# Patient Record
Sex: FEMALE | Race: WHITE | Employment: OTHER | URBAN - METROPOLITAN AREA
[De-identification: names, ages, dates, MRNs, and addresses within clinical notes are randomized per-mention and may not be internally consistent; named-entity substitution may affect disease eponyms.]

---

## 2023-10-04 ENCOUNTER — OFFICE VISIT (OUTPATIENT)
Dept: PRIMARY CARE | Facility: CLINIC | Age: 73
End: 2023-10-04
Payer: MEDICARE

## 2023-10-04 VITALS
SYSTOLIC BLOOD PRESSURE: 128 MMHG | DIASTOLIC BLOOD PRESSURE: 80 MMHG | TEMPERATURE: 97.3 F | HEART RATE: 80 BPM | WEIGHT: 146 LBS | BODY MASS INDEX: 24.32 KG/M2 | HEIGHT: 65 IN

## 2023-10-04 DIAGNOSIS — E78.2 MIXED HYPERLIPIDEMIA: ICD-10-CM

## 2023-10-04 DIAGNOSIS — Z11.59 NEED FOR HEPATITIS C SCREENING TEST: ICD-10-CM

## 2023-10-04 DIAGNOSIS — I10 PRIMARY HYPERTENSION: ICD-10-CM

## 2023-10-04 DIAGNOSIS — Z00.00 MEDICARE ANNUAL WELLNESS VISIT, SUBSEQUENT: Primary | ICD-10-CM

## 2023-10-04 DIAGNOSIS — Z78.0 POSTMENOPAUSAL STATE: ICD-10-CM

## 2023-10-04 DIAGNOSIS — K21.9 GASTROESOPHAGEAL REFLUX DISEASE WITHOUT ESOPHAGITIS: ICD-10-CM

## 2023-10-04 DIAGNOSIS — Z12.31 ENCOUNTER FOR SCREENING MAMMOGRAM FOR BREAST CANCER: ICD-10-CM

## 2023-10-04 PROBLEM — J01.90 ACUTE RHINOSINUSITIS: Status: RESOLVED | Noted: 2023-10-04 | Resolved: 2023-10-04

## 2023-10-04 PROBLEM — N39.0 ACUTE LOWER UTI (URINARY TRACT INFECTION): Status: RESOLVED | Noted: 2023-10-04 | Resolved: 2023-10-04

## 2023-10-04 PROBLEM — K63.5 POLYP OF COLON: Status: ACTIVE | Noted: 2023-10-04

## 2023-10-04 PROBLEM — J30.2 SEASONAL ALLERGIES: Status: ACTIVE | Noted: 2023-10-04

## 2023-10-04 PROBLEM — R74.8 ELEVATED ALKALINE PHOSPHATASE LEVEL: Status: ACTIVE | Noted: 2023-10-04

## 2023-10-04 PROBLEM — N89.8 VAGINAL DRYNESS: Status: ACTIVE | Noted: 2023-10-04

## 2023-10-04 PROBLEM — D22.5 MELANOCYTIC NEVI OF TRUNK: Status: RESOLVED | Noted: 2022-10-04 | Resolved: 2023-10-04

## 2023-10-04 PROBLEM — D18.01 HEMANGIOMA OF SKIN AND SUBCUTANEOUS TISSUE: Status: RESOLVED | Noted: 2022-10-04 | Resolved: 2023-10-04

## 2023-10-04 PROBLEM — E78.5 HLD (HYPERLIPIDEMIA): Status: ACTIVE | Noted: 2023-10-04

## 2023-10-04 PROBLEM — R39.9 URINARY SYMPTOM OR SIGN: Status: RESOLVED | Noted: 2023-10-04 | Resolved: 2023-10-04

## 2023-10-04 PROBLEM — R39.9 UTI SYMPTOMS: Status: RESOLVED | Noted: 2023-10-04 | Resolved: 2023-10-04

## 2023-10-04 PROBLEM — K64.4 EXTERNAL HEMORRHOIDS: Status: ACTIVE | Noted: 2020-03-18

## 2023-10-04 PROBLEM — L25.9 CONTACT DERMATITIS: Status: RESOLVED | Noted: 2023-10-04 | Resolved: 2023-10-04

## 2023-10-04 PROBLEM — F41.9 ANXIETY: Status: ACTIVE | Noted: 2023-10-04

## 2023-10-04 PROBLEM — L82.1 OTHER SEBORRHEIC KERATOSIS: Status: RESOLVED | Noted: 2022-10-04 | Resolved: 2023-10-04

## 2023-10-04 PROBLEM — L81.4 OTHER MELANIN HYPERPIGMENTATION: Status: RESOLVED | Noted: 2022-10-04 | Resolved: 2023-10-04

## 2023-10-04 LAB
NON-UH HIE A/G RATIO: 1.6
NON-UH HIE ALB: 4.3 G/DL (ref 3.4–5)
NON-UH HIE ALK PHOS: 96 UNIT/L (ref 46–116)
NON-UH HIE BILIRUBIN, TOTAL: 0.7 MG/DL (ref 0.2–1)
NON-UH HIE BUN/CREAT RATIO: 16.7
NON-UH HIE BUN: 15 MG/DL (ref 9–23)
NON-UH HIE CALCIUM: 9.7 MG/DL (ref 8.7–10.4)
NON-UH HIE CALCULATED LDL CHOLESTEROL: 96 MG/DL (ref 60–130)
NON-UH HIE CALCULATED OSMOLALITY: 284 MOSM/KG (ref 275–295)
NON-UH HIE CHLORIDE: 107 MMOL/L (ref 98–107)
NON-UH HIE CHOLESTEROL: 184 MG/DL (ref 100–200)
NON-UH HIE CO2, VENOUS: 28 MMOL/L (ref 20–31)
NON-UH HIE CREATININE: 0.9 MG/DL (ref 0.5–0.8)
NON-UH HIE GFR AA: >60
NON-UH HIE GLOBULIN: 2.7 G/DL
NON-UH HIE GLOMERULAR FILTRATION RATE: >60 ML/MIN/1.73M?
NON-UH HIE GLUCOSE: 105 MG/DL (ref 74–106)
NON-UH HIE GOT: 20 UNIT/L (ref 15–37)
NON-UH HIE GPT: 17 UNIT/L (ref 10–49)
NON-UH HIE HDL CHOLESTEROL: 60 MG/DL (ref 40–60)
NON-UH HIE HEPATITIS C ANTIBODY: NONREACTIVE
NON-UH HIE K: 4.3 MMOL/L (ref 3.5–5.1)
NON-UH HIE NA: 142 MMOL/L (ref 135–145)
NON-UH HIE TOTAL CHOL/HDL CHOL RATIO: 3.1
NON-UH HIE TOTAL PROTEIN: 7 G/DL (ref 5.7–8.2)
NON-UH HIE TRIGLYCERIDES: 141 MG/DL (ref 30–150)

## 2023-10-04 PROCEDURE — 1159F MED LIST DOCD IN RCRD: CPT | Performed by: FAMILY MEDICINE

## 2023-10-04 PROCEDURE — 3079F DIAST BP 80-89 MM HG: CPT | Performed by: FAMILY MEDICINE

## 2023-10-04 PROCEDURE — G0008 ADMIN INFLUENZA VIRUS VAC: HCPCS | Performed by: FAMILY MEDICINE

## 2023-10-04 PROCEDURE — 3074F SYST BP LT 130 MM HG: CPT | Performed by: FAMILY MEDICINE

## 2023-10-04 PROCEDURE — G0439 PPPS, SUBSEQ VISIT: HCPCS | Performed by: FAMILY MEDICINE

## 2023-10-04 PROCEDURE — 90662 IIV NO PRSV INCREASED AG IM: CPT | Performed by: FAMILY MEDICINE

## 2023-10-04 PROCEDURE — 1036F TOBACCO NON-USER: CPT | Performed by: FAMILY MEDICINE

## 2023-10-04 PROCEDURE — 1160F RVW MEDS BY RX/DR IN RCRD: CPT | Performed by: FAMILY MEDICINE

## 2023-10-04 PROCEDURE — 1170F FXNL STATUS ASSESSED: CPT | Performed by: FAMILY MEDICINE

## 2023-10-04 PROCEDURE — 99213 OFFICE O/P EST LOW 20 MIN: CPT | Performed by: FAMILY MEDICINE

## 2023-10-04 RX ORDER — AMLODIPINE BESYLATE 2.5 MG/1
2.5 TABLET ORAL DAILY
Qty: 90 TABLET | Refills: 3 | Status: SHIPPED | OUTPATIENT
Start: 2023-10-04

## 2023-10-04 RX ORDER — OMEPRAZOLE 40 MG/1
1 CAPSULE, DELAYED RELEASE ORAL DAILY
COMMUNITY
Start: 2022-10-08 | End: 2023-10-04 | Stop reason: SDUPTHER

## 2023-10-04 RX ORDER — AMLODIPINE BESYLATE 2.5 MG/1
1 TABLET ORAL DAILY
COMMUNITY
Start: 2019-11-21 | End: 2023-10-04 | Stop reason: SDUPTHER

## 2023-10-04 RX ORDER — SIMVASTATIN 20 MG/1
20 TABLET, FILM COATED ORAL EVERY EVENING
Qty: 90 TABLET | Refills: 3 | Status: SHIPPED | OUTPATIENT
Start: 2023-10-04

## 2023-10-04 RX ORDER — SIMVASTATIN 20 MG/1
1 TABLET, FILM COATED ORAL EVERY EVENING
COMMUNITY
Start: 2014-10-29 | End: 2023-10-04 | Stop reason: SDUPTHER

## 2023-10-04 RX ORDER — OMEPRAZOLE 40 MG/1
40 CAPSULE, DELAYED RELEASE ORAL DAILY
Qty: 90 CAPSULE | Refills: 3 | Status: SHIPPED | OUTPATIENT
Start: 2023-10-04

## 2023-10-04 ASSESSMENT — ACTIVITIES OF DAILY LIVING (ADL)
MANAGING_FINANCES: INDEPENDENT
DRESSING: INDEPENDENT
DOING_HOUSEWORK: INDEPENDENT
TAKING_MEDICATION: INDEPENDENT
GROCERY_SHOPPING: INDEPENDENT
BATHING: INDEPENDENT

## 2023-10-04 ASSESSMENT — ENCOUNTER SYMPTOMS
HEADACHES: 0
PALPITATIONS: 0
ORTHOPNEA: 0
MYALGIAS: 0
LEG PAIN: 0
SWEATS: 0
BLURRED VISION: 0
NECK PAIN: 0
HYPERTENSION: 1
FOCAL SENSORY LOSS: 0
PND: 0
FOCAL WEAKNESS: 0
SHORTNESS OF BREATH: 0

## 2023-10-04 ASSESSMENT — PATIENT HEALTH QUESTIONNAIRE - PHQ9
SUM OF ALL RESPONSES TO PHQ9 QUESTIONS 1 AND 2: 0
1. LITTLE INTEREST OR PLEASURE IN DOING THINGS: NOT AT ALL
2. FEELING DOWN, DEPRESSED OR HOPELESS: NOT AT ALL

## 2023-10-04 NOTE — PROGRESS NOTES
Subjective   Reason for Visit: Josse Owens is an 73 y.o. female here for a Medicare Wellness visit.     Past Medical, Surgical, and Family History reviewed and updated in chart.    Reviewed all medications by prescribing practitioner or clinical pharmacist (such as prescriptions, OTCs, herbal therapies and supplements) and documented in the medical record.    Very pleasant 73 year old here for annual wellness exam  No recent changes or hospital visits  Followup hypertension and hyperlipidemia   Stable no issues     Hyperlipidemia  This is a chronic problem. The current episode started more than 1 year ago. The problem is controlled. Recent lipid tests were reviewed and are variable. She has no history of chronic renal disease, diabetes, hypothyroidism, liver disease, obesity or nephrotic syndrome. There are no known factors aggravating her hyperlipidemia. Pertinent negatives include no chest pain, focal sensory loss, focal weakness, leg pain, myalgias or shortness of breath. Current antihyperlipidemic treatment includes exercise, diet change and statins. The current treatment provides significant improvement of lipids. There are no compliance problems.  Risk factors for coronary artery disease include dyslipidemia, family history and post-menopausal.   Hypertension  This is a chronic problem. The current episode started more than 1 year ago. The problem is controlled. Pertinent negatives include no anxiety, blurred vision, chest pain, headaches, malaise/fatigue, neck pain, orthopnea, palpitations, peripheral edema, PND, shortness of breath or sweats. There are no associated agents to hypertension. Risk factors for coronary artery disease include family history and post-menopausal state. Past treatments include calcium channel blockers. The current treatment provides significant improvement. There are no compliance problems.  There is no history of angina, kidney disease, CAD/MI, CVA, heart failure, left  ventricular hypertrophy, PVD or retinopathy. There is no history of chronic renal disease, coarctation of the aorta, hyperaldosteronism, hypercortisolism, hyperparathyroidism, a hypertension causing med, pheochromocytoma, renovascular disease, sleep apnea or a thyroid problem.       Patient Self Assessment of Health Status  Patient Self Assessment: Good    Nutrition and Exercise  Current Diet: Well Balanced Diet  Adequate Fluid Intake: Yes  Caffeine: Yes  Exercise Frequency: Regularly    Functional Ability/Level of Safety  Cognitive Impairment Observed: No cognitive impairment observed    Home Safety Risk Factors: None         Patient Care Team:  Jerri Krishnan MD as PCP - General  Jerri Krishnan MD as PCP - [facility] Attributed Provider     Review of Systems   Constitutional:  Negative for malaise/fatigue.   Eyes:  Negative for blurred vision.   Respiratory:  Negative for shortness of breath.    Cardiovascular:  Negative for chest pain, palpitations, orthopnea and PND.   Musculoskeletal:  Negative for myalgias and neck pain.   Neurological:  Negative for focal weakness and headaches.     Constitutional: no chills, no fever and no night sweats.   Eyes: no blurred vision and no eyesight problems.   ENT: no hearing loss, no nasal congestion, no nasal discharge, no hoarseness and no sore throat.   Cardiovascular: no chest pain, no intermittent leg claudication, no lower extremity edema, no palpitations and no syncope.   Respiratory: no cough, no shortness of breath during exertion, no shortness of breath at rest and no wheezing.   Gastrointestinal: no abdominal pain, no blood in stools, no constipation, no diarrhea, no melena, no nausea, no rectal pain and no vomiting.   Genitourinary: no dysuria, no change in urinary frequency, no urinary hesitancy, no feelings of urinary urgency and no vaginal discharge.   Musculoskeletal: no arthralgias,  no back pain and no myalgias.   Integumentary: no new skin lesions and no  "rashes.   Neurological: no difficulty walking, no headache, no limb weakness, no numbness and no tingling.   Psychiatric: no anxiety, no depression, no anhedonia and no substance use disorders.   Endocrine: no recent weight gain and no recent weight loss.   Hematologic/Lymphatic: no tendency for easy bruising and no swollen glands .    Medicare Wellness Billing Compliance Satisfied    *This is a visual tool to show completion of required items on the day of the visit. Green checks will only appear on the date of visit.      Objective   Vitals:  /80   Pulse 80   Temp 36.3 °C (97.3 °F)   Ht 1.651 m (5' 5\")   Wt 66.2 kg (146 lb)   BMI 24.30 kg/m²       Physical Exam  The patient appeared well nourished and normally developed. Vital signs as documented. Head exam is unremarkable. No scleral icterus or corneal arcus noted.  Pupils are equal round reactive to light extraocular movements are intact no hemorrhages noted on funduscopic exam mouth mucous membranes are moist no exudates ears canals clear TMs are gray pearly not injected nose no rhinorrhea or epistaxis Neck is without jugular venous distension, thyromegaly, or carotid bruits. Carotid upstrokes are brisk bilaterally. Lungs are clear to auscultation and percussion. Cardiac exam reveals the PMI to be normally sized and situated. Rhythm is regular. First and second heart sounds normal. No murmurs, rubs or gallops. Abdominal exam reveals normal bowel sounds, no masses, no organomegaly and no aortic enlargement. Extremities are nonedematous and both femoral and pedal pulses are normal.  Neurologic exam DTRs are equal bilaterally no focal deficits strength is symmetrical heme lymph no palpable lymph nodes in the neck axilla or groin    Assessment/Plan   Problem List Items Addressed This Visit       Gastroesophageal reflux disease    Relevant Medications    omeprazole (PriLOSEC) 40 mg DR capsule    Hypertension    Relevant Medications    amLODIPine (Norvasc) " 2.5 mg tablet    Other Relevant Orders    Comprehensive Metabolic Panel    HLD (hyperlipidemia)    Relevant Medications    simvastatin (Zocor) 20 mg tablet    Other Relevant Orders    Lipid Panel    Medicare annual wellness visit, subsequent - Primary    Current Assessment & Plan     Continue annual exams  Normal exam          Other Visit Diagnoses       Need for hepatitis C screening test        Relevant Orders    Hepatitis C antibody    Postmenopausal state        Relevant Orders    XR DEXA bone density    Encounter for screening mammogram for breast cancer        Relevant Orders    BI mammo bilateral screening tomosynthesis          Hypertension controlled continue amlodipine  GERD stable contineu diet modifications and continue omeprazole  Hyperlipidemia stable based on symptoms and exam   Continue zocor   Continue annual exams

## 2023-10-10 ENCOUNTER — OFFICE VISIT (OUTPATIENT)
Dept: DERMATOLOGY | Facility: CLINIC | Age: 73
End: 2023-10-10
Payer: MEDICARE

## 2023-10-10 DIAGNOSIS — L82.0 KERATOSIS, INFLAMED SEBORRHEIC: ICD-10-CM

## 2023-10-10 DIAGNOSIS — Z12.83 SKIN CANCER SCREENING: Primary | ICD-10-CM

## 2023-10-10 PROCEDURE — 1036F TOBACCO NON-USER: CPT | Performed by: NURSE PRACTITIONER

## 2023-10-10 PROCEDURE — 99213 OFFICE O/P EST LOW 20 MIN: CPT | Performed by: NURSE PRACTITIONER

## 2023-10-10 PROCEDURE — 1159F MED LIST DOCD IN RCRD: CPT | Performed by: NURSE PRACTITIONER

## 2023-10-10 PROCEDURE — 1160F RVW MEDS BY RX/DR IN RCRD: CPT | Performed by: NURSE PRACTITIONER

## 2023-10-10 NOTE — PROGRESS NOTES
Subjective     Josse Owens is a 73 y.o. female who presents for the following: Skin Check (Pt in for annual skin check. Last seen by Deedee Wang in 0ct 2022.).     Review of Systems:  No other skin or systemic complaints other than what is documented elsewhere in the note.    The following portions of the chart were reviewed this encounter and updated as appropriate:       Skin Cancer History  No skin cancer on file.    Specialty Problems    None    Past Medical History:  Josse Owens  has a past medical history of Acute lower UTI (urinary tract infection) (10/04/2023), Acute rhinosinusitis (10/04/2023), Allergy, unspecified, initial encounter (11/03/2013), Cough, unspecified (11/03/2013), Dehydration (11/03/2013), Dermatitis, unspecified (11/03/2013), Edema, unspecified (11/03/2013), Encounter for general adult medical examination without abnormal findings (11/03/2013), Encounter for gynecological examination (general) (routine) without abnormal findings (11/03/2013), Hemangioma of skin and subcutaneous tissue (10/04/2022), Melanocytic nevi of trunk (10/04/2022), Noninfective gastroenteritis and colitis, unspecified (11/03/2013), Other fatigue (11/03/2013), Other seborrheic keratosis (10/04/2022), Other specified disorders of veins (11/03/2013), Personal history of other diseases of the nervous system and sense organs (10/29/2014), Personal history of peptic ulcer disease, Personal history of urinary (tract) infections (06/24/2015), Unspecified abdominal pain (11/03/2013), and Urinary symptom or sign (10/04/2023).    Past Surgical History:  Josse Owens  has a past surgical history that includes Other surgical history (11/13/2019); Tonsillectomy (10/29/2014); Colonoscopy (11/01/2017); Other surgical history (11/03/2020); and Other surgical history (11/03/2020).    Family History:  Patient family history includes Arthritis in her mother; Heart disease in her father.    Social History:  Josse DEL ROSARIO  Graciela  reports that she has never smoked. She has never used smokeless tobacco. She reports that she does not drink alcohol and does not use drugs.    Allergies:  Bee venom protein (honey bee), Egg, Penicillins, and Tetracyclines    Current Medications / CAM's:    Current Outpatient Medications:     amLODIPine (Norvasc) 2.5 mg tablet, Take 1 tablet (2.5 mg) by mouth once daily., Disp: 90 tablet, Rfl: 3    omeprazole (PriLOSEC) 40 mg DR capsule, Take 1 capsule (40 mg) by mouth once daily., Disp: 90 capsule, Rfl: 3    simvastatin (Zocor) 20 mg tablet, Take 1 tablet (20 mg) by mouth once daily in the evening., Disp: 90 tablet, Rfl: 3     Objective   Well appearing patient in no apparent distress; mood and affect are within normal limits.    A full examination was performed including scalp, head, eyes, ears, nose, lips, neck, chest, axillae, abdomen, back, buttocks, bilateral upper extremities, bilateral lower extremities, hands, feet, fingers, toes, fingernails, and toenails. All findings within normal limits unless otherwise noted below.    Assessment/Plan

## 2023-10-10 NOTE — PROGRESS NOTES
Subjective     Josse Owens is a 73 y.o. female who presents for the following: Skin Check (Pt in for annual skin check. Last seen by Deedee Wang in 0ct 2022.).     Review of Systems:  No other skin or systemic complaints other than what is documented elsewhere in the note.    The following portions of the chart were reviewed this encounter and updated as appropriate:          Skin Cancer History  No skin cancer on file.      Specialty Problems    None       Objective   Well appearing patient in no apparent distress; mood and affect are within normal limits.    A focused skin examination was performed. All findings within normal limits unless otherwise noted below.    Assessment/Plan   1. Skin cancer screening  Benign skin lesions noted.     -These lesions have benign, reassuring patterns on dermoscopy.  -There were no concerning features found on exam today.  -Recommend continued self-observation, and to contact the office if any changes in nevi are  noticed.    Benign appearing nevi  Instructions: Monthly self-skin checks to monitor for any changes in moles are recommended. Expectations: Benign Nevi are pigmented nests of cells within the skin. No treatment is necessary.     Lentigines, self limited  -Benign, Reassurance  -Recommend continued observation.    [place]unsford Angiomas, self limited  -Benign, reassurance  -Recommend non-intervention and continued observation.    Seborrheic Keratoses, self limited  -Benign, reassurance  -These are healthy, benign growths of the skin. They are not dangerous and do not require any  treatment.  -Recommend non-intervention and continued observation.    Discussed/information given on safe sun practices and use of sunscreen, sun protective clothing or sun avoidance. Recommend to use OTC medication of sunscreen SPF 30 or higher on a daily basis prior to sun exposure to reduce the risk of skin cancer.    Contact Office if: Any lesions change in size, shape or color; itch, bum or  bleed.    2. Keratosis, inflamed seborrheic  Right Abdomen (side) - Lower  Irritated crusted macule     Plan:Counseling.  I counseled the patient regarding the following:  Skin Care: Seborrheic Keratoses are benign. No treatment is necessary, however given the amount of irritation on exam today treatment is initiated.  Expectations:Seborrheic Keratoses are benign warty growths. Patients get more of them as they age.

## 2023-10-10 NOTE — PROGRESS NOTES
Subjective     Josse Owens is a 73 y.o. female who presents for the following: Skin Check (Pt in for annual skin check. Last seen by Deedee Wang in 0ct 2022.).     Review of Systems:  No other skin or systemic complaints other than what is documented elsewhere in the note.    The following portions of the chart were reviewed this encounter and updated as appropriate:          Skin Cancer History  No skin cancer on file.      Specialty Problems    None       Objective   Well appearing patient in no apparent distress; mood and affect are within normal limits.    A focused skin examination was performed. All findings within normal limits unless otherwise noted below.    Assessment/Plan   1. Skin cancer screening  Benign skin lesions noted.     -These lesions have benign, reassuring patterns on dermoscopy.  -There were no concerning features found on exam today.  -Recommend continued self-observation, and to contact the office if any changes in nevi are  noticed.    Benign appearing nevi  Instructions: Monthly self-skin checks to monitor for any changes in moles are recommended. Expectations: Benign Nevi are pigmented nests of cells within the skin. No treatment is necessary.     Lentigines, self limited  -Benign, Reassurance  -Recommend continued observation.    Lunsford Angiomas, self limited  -Benign, reassurance  -Recommend non-intervention and continued observation.    Seborrheic Keratoses, self limited  -Benign, reassurance  -These are healthy, benign growths of the skin. They are not dangerous and do not require any  treatment.  -Recommend non-intervention and continued observation.    Discussed/information given on safe sun practices and use of sunscreen, sun protective clothing or sun avoidance. Recommend to use OTC medication of sunscreen SPF 30 or higher on a daily basis prior to sun exposure to reduce the risk of skin cancer.    Contact Office if: Any lesions change in size, shape or color; itch, bum or  bleed.    2. Keratosis, inflamed seborrheic  Right Abdomen (side) - Lower  Irritated crusted macule     Plan:Counseling.  I counseled the patient regarding the following:  Skin Care: Seborrheic Keratoses are benign. No treatment is necessary, however given the amount of irritation on exam today treatment is initiated.  Expectations:Seborrheic Keratoses are benign warty growths. Patients get more of them as they age.      Cryotherapy, skin lesion - Right Abdomen (side) - Lower

## 2023-10-27 ENCOUNTER — TELEPHONE (OUTPATIENT)
Dept: PRIMARY CARE | Facility: CLINIC | Age: 73
End: 2023-10-27
Payer: MEDICARE

## 2023-11-13 ENCOUNTER — TELEPHONE (OUTPATIENT)
Dept: PRIMARY CARE | Facility: CLINIC | Age: 73
End: 2023-11-13
Payer: MEDICARE

## 2023-11-13 NOTE — TELEPHONE ENCOUNTER
----- Message from Jerri Krishnan MD sent at 11/12/2023 10:54 PM EST -----  Please call patient  Her blood work is normal her cholesterol levels are excellent we can repeat in 1 year

## 2023-11-13 NOTE — TELEPHONE ENCOUNTER
----- Message from Jerri Krishnan MD sent at 11/12/2023 10:55 PM EST -----  Please call patient  Bone density shows she has osteoporosis  We can discuss treatment the next time she is in the office  I do recommend regular physical exercise lifting weights and calcium and vitamin D supplement

## 2023-11-13 NOTE — RESULT ENCOUNTER NOTE
Please call patient  Her blood work is normal her cholesterol levels are excellent we can repeat in 1 year

## 2023-11-13 NOTE — RESULT ENCOUNTER NOTE
Please call patient  Bone density shows she has osteoporosis  We can discuss treatment the next time she is in the office  I do recommend regular physical exercise lifting weights and calcium and vitamin D supplement

## 2023-11-24 NOTE — RESULT ENCOUNTER NOTE
Please call patient  Her mammogram is negative with no sign of cancer at this time  Repeat in 1 year  Call if you need anything

## 2023-11-27 ENCOUNTER — TELEPHONE (OUTPATIENT)
Dept: PRIMARY CARE | Facility: CLINIC | Age: 73
End: 2023-11-27
Payer: MEDICARE

## 2023-11-27 NOTE — TELEPHONE ENCOUNTER
----- Message from Jerri Krishnan MD sent at 11/24/2023  4:38 PM EST -----  Please call patient  Her mammogram is negative with no sign of cancer at this time  Repeat in 1 year  Call if you need anything

## 2023-12-05 ENCOUNTER — OFFICE VISIT (OUTPATIENT)
Dept: PRIMARY CARE | Facility: CLINIC | Age: 73
End: 2023-12-05
Payer: MEDICARE

## 2023-12-05 VITALS
BODY MASS INDEX: 24.32 KG/M2 | SYSTOLIC BLOOD PRESSURE: 130 MMHG | TEMPERATURE: 98 F | HEART RATE: 88 BPM | HEIGHT: 65 IN | WEIGHT: 146 LBS | DIASTOLIC BLOOD PRESSURE: 70 MMHG

## 2023-12-05 DIAGNOSIS — L25.9 CONTACT DERMATITIS, UNSPECIFIED CONTACT DERMATITIS TYPE, UNSPECIFIED TRIGGER: Primary | ICD-10-CM

## 2023-12-05 DIAGNOSIS — M81.0 AGE-RELATED OSTEOPOROSIS WITHOUT CURRENT PATHOLOGICAL FRACTURE: ICD-10-CM

## 2023-12-05 PROCEDURE — 1036F TOBACCO NON-USER: CPT | Performed by: FAMILY MEDICINE

## 2023-12-05 PROCEDURE — 1159F MED LIST DOCD IN RCRD: CPT | Performed by: FAMILY MEDICINE

## 2023-12-05 PROCEDURE — 3078F DIAST BP <80 MM HG: CPT | Performed by: FAMILY MEDICINE

## 2023-12-05 PROCEDURE — 3075F SYST BP GE 130 - 139MM HG: CPT | Performed by: FAMILY MEDICINE

## 2023-12-05 PROCEDURE — 1160F RVW MEDS BY RX/DR IN RCRD: CPT | Performed by: FAMILY MEDICINE

## 2023-12-05 PROCEDURE — 99213 OFFICE O/P EST LOW 20 MIN: CPT | Performed by: FAMILY MEDICINE

## 2023-12-05 RX ORDER — TRIAMCINOLONE ACETONIDE 1 MG/G
CREAM TOPICAL 2 TIMES DAILY
Qty: 30 G | Refills: 2 | Status: SHIPPED | OUTPATIENT
Start: 2023-12-05

## 2023-12-05 RX ORDER — METHYLPREDNISOLONE 4 MG/1
TABLET ORAL
Qty: 21 TABLET | Refills: 0 | Status: SHIPPED | OUTPATIENT
Start: 2023-12-05 | End: 2023-12-12

## 2023-12-05 RX ORDER — TRIAMCINOLONE ACETONIDE 1 MG/G
CREAM TOPICAL 2 TIMES DAILY
Qty: 30 G | Refills: 0 | Status: SHIPPED | OUTPATIENT
Start: 2023-12-05 | End: 2023-12-05 | Stop reason: SDUPTHER

## 2023-12-05 ASSESSMENT — PATIENT HEALTH QUESTIONNAIRE - PHQ9
2. FEELING DOWN, DEPRESSED OR HOPELESS: NOT AT ALL
SUM OF ALL RESPONSES TO PHQ9 QUESTIONS 1 AND 2: 0
1. LITTLE INTEREST OR PLEASURE IN DOING THINGS: NOT AT ALL

## 2023-12-05 NOTE — PROGRESS NOTES
"Subjective   Patient ID: Josse Owens is a 73 y.o. female who presents for Osteoporosis (Seen on recent DEXA).  Very pleasant 73-year-old   Recently diagnosed with osteoporosis on routine screening DEXA  Very hesitant to take medication  Here to discuss the result and possible options  Reviewed diet vitamin D and calcium supplement regular physical exercise  She likes that actually better tried at the medication  Concerned about stomach issues no also here for rash  Has had it for about a week very itchy  Lower extremities and arm  Concerned she might of contracted something  No tightness\" difficulty breathing no fever or chills no nausea or vomiting no mental status changes        Review of Systems  Constitutional: no chills, no fever and no night sweats.   Eyes: no blurred vision and no eyesight problems.   ENT: no hearing loss, no nasal congestion, no nasal discharge, no hoarseness and no sore throat.   Cardiovascular: no chest pain, no intermittent leg claudication, no lower extremity edema, no palpitations and no syncope.   Respiratory: no cough, no shortness of breath during exertion, no shortness of breath at rest and no wheezing.   Gastrointestinal: no abdominal pain, no blood in stools, no constipation, no diarrhea, no melena, no nausea, no rectal pain and no vomiting.   Genitourinary: no dysuria, no change in urinary frequency, no urinary hesitancy, no feelings of urinary urgency and no vaginal discharge.   Musculoskeletal: no arthralgias,  no back pain and no myalgias.   Integumentary: no new skin lesions and no rashes.   Neurological: no difficulty walking, no headache, no limb weakness, no numbness and no tingling.   Psychiatric: no anxiety, no depression, no anhedonia and no substance use disorders.   Endocrine: no recent weight gain and no recent weight loss.   Hematologic/Lymphatic: no tendency for easy bruising and no swollen glands .    Objective    /70   Pulse 88   " "Temp 36.7 °C (98 °F)   Ht 1.651 m (5' 5\")   Wt 66.2 kg (146 lb)   BMI 24.30 kg/m²    Physical Exam  The patient appeared well nourished and normally developed. Vital signs as documented. Head exam is unremarkable. No scleral icterus or corneal arcus noted.  Pupils are equal round reactive to light extraocular movements are intact no hemorrhages noted on funduscopic exam mouth mucous membranes are moist no exudates ears canals clear TMs are gray pearly not injected nose no rhinorrhea or epistaxis Neck is without jugular venous distension, thyromegaly, or carotid bruits. Carotid upstrokes are brisk bilaterally. Lungs are clear to auscultation and percussion. Cardiac exam reveals the PMI to be normally sized and situated. Rhythm is regular. First and second heart sounds normal. No murmurs, rubs or gallops. Abdominal exam reveals normal bowel sounds, no masses, no organomegaly and no aortic enlargement. Extremities are nonedematous and both femoral and pedal pulses are normal.  Neurologic exam DTRs are equal bilaterally no focal deficits strength is symmetrical heme lymph no palpable lymph nodes in the neck axilla or groin    Assessment/Plan   Problem List Items Addressed This Visit       Contact dermatitis - Primary     Medrol Dosepak  Apply moisturizer  Consult dermatology if no improvement         Relevant Medications    triamcinolone (Kenalog) 0.1 % cream    Age-related osteoporosis without current pathological fracture     Regular physical exercise for muscle and bone stregthening  Consider alendronate   Repeat dexa in 2 years                   Jerri Krishnan MD  "

## 2023-12-30 PROBLEM — M81.0 AGE-RELATED OSTEOPOROSIS WITHOUT CURRENT PATHOLOGICAL FRACTURE: Status: ACTIVE | Noted: 2023-12-30

## 2023-12-30 PROBLEM — L25.9 CONTACT DERMATITIS: Status: ACTIVE | Noted: 2023-12-30

## 2023-12-30 NOTE — ASSESSMENT & PLAN NOTE
Regular physical exercise for muscle and bone stregthening  Consider alendronate   Repeat dexa in 2 years

## 2024-04-19 ENCOUNTER — OFFICE VISIT (OUTPATIENT)
Dept: PRIMARY CARE | Facility: CLINIC | Age: 74
End: 2024-04-19
Payer: MEDICARE

## 2024-04-19 VITALS
SYSTOLIC BLOOD PRESSURE: 138 MMHG | DIASTOLIC BLOOD PRESSURE: 85 MMHG | WEIGHT: 146.6 LBS | HEART RATE: 96 BPM | HEIGHT: 65 IN | TEMPERATURE: 98.5 F | BODY MASS INDEX: 24.43 KG/M2

## 2024-04-19 DIAGNOSIS — F41.1 GENERALIZED ANXIETY DISORDER: ICD-10-CM

## 2024-04-19 DIAGNOSIS — M79.89 SOFT TISSUE MASS: Primary | ICD-10-CM

## 2024-04-19 PROBLEM — E78.2 MIXED HYPERLIPIDEMIA: Status: ACTIVE | Noted: 2023-10-04

## 2024-04-19 PROCEDURE — 99213 OFFICE O/P EST LOW 20 MIN: CPT | Performed by: FAMILY MEDICINE

## 2024-04-19 PROCEDURE — 1123F ACP DISCUSS/DSCN MKR DOCD: CPT | Performed by: FAMILY MEDICINE

## 2024-04-19 PROCEDURE — 3075F SYST BP GE 130 - 139MM HG: CPT | Performed by: FAMILY MEDICINE

## 2024-04-19 PROCEDURE — 3079F DIAST BP 80-89 MM HG: CPT | Performed by: FAMILY MEDICINE

## 2024-04-19 PROCEDURE — 1036F TOBACCO NON-USER: CPT | Performed by: FAMILY MEDICINE

## 2024-04-19 PROCEDURE — 1157F ADVNC CARE PLAN IN RCRD: CPT | Performed by: FAMILY MEDICINE

## 2024-04-19 PROCEDURE — 1159F MED LIST DOCD IN RCRD: CPT | Performed by: FAMILY MEDICINE

## 2024-04-19 PROCEDURE — 1158F ADVNC CARE PLAN TLK DOCD: CPT | Performed by: FAMILY MEDICINE

## 2024-04-19 PROCEDURE — 1160F RVW MEDS BY RX/DR IN RCRD: CPT | Performed by: FAMILY MEDICINE

## 2024-04-19 RX ORDER — ESCITALOPRAM OXALATE 5 MG/1
5 TABLET ORAL DAILY
Qty: 90 TABLET | Refills: 3 | Status: SHIPPED | OUTPATIENT
Start: 2024-04-19 | End: 2025-04-19

## 2024-04-19 NOTE — PROGRESS NOTES
"Subjective   Patient ID: Josse Owens is a 73 y.o. female who presents for Mass (Lump on her back that causes pain, possible lipoma).  Very pleasant 73-year-old with mass on her back  Hurts to lay down  No fever or systemic symptoms  Does not feel it draining  Has been there for several weeks and is getting bigger  Also has been suffering from anxiety  Longstanding issue for her  Starting to interfere with quality of life  Asking for medication to help not a danger to herself or others        Review of Systems  Constitutional: no chills, no fever and no night sweats.   Eyes: no blurred vision and no eyesight problems.   ENT: no hearing loss, no nasal congestion, no nasal discharge, no hoarseness and no sore throat.   Cardiovascular: no chest pain, no intermittent leg claudication, no lower extremity edema, no palpitations and no syncope.   Respiratory: no cough, no shortness of breath during exertion, no shortness of breath at rest and no wheezing.   Gastrointestinal: no abdominal pain, no blood in stools, no constipation, no diarrhea, no melena, no nausea, no rectal pain and no vomiting.   Genitourinary: no dysuria, no change in urinary frequency, no urinary hesitancy, no feelings of urinary urgency and no vaginal discharge.   Musculoskeletal: no arthralgias,  no back pain and no myalgias.   Integumentary: no new skin lesions and no rashes.   Neurological: no difficulty walking, no headache, no limb weakness, no numbness and no tingling.   Psychiatric: no anxiety, no depression, no anhedonia and no substance use disorders.   Endocrine: no recent weight gain and no recent weight loss.   Hematologic/Lymphatic: no tendency for easy bruising and no swollen glands .    Objective    /85   Pulse 96   Temp 36.9 °C (98.5 °F)   Ht 1.651 m (5' 5\")   Wt 66.5 kg (146 lb 9.6 oz)   BMI 24.40 kg/m²    Physical Exam  The patient appeared well nourished and normally developed. Vital signs as documented. Head exam is " unremarkable. No scleral icterus or corneal arcus noted.  Pupils are equal round reactive to light extraocular movements are intact no hemorrhages noted on funduscopic exam mouth mucous membranes are moist no exudates ears canals clear TMs are gray pearly not injected nose no rhinorrhea or epistaxis Neck is without jugular venous distension, thyromegaly, or carotid bruits. Carotid upstrokes are brisk bilaterally. Lungs are clear to auscultation and percussion. Cardiac exam reveals the PMI to be normally sized and situated. Rhythm is regular. First and second heart sounds normal. No murmurs, rubs or gallops. Abdominal exam reveals normal bowel sounds, no masses, no organomegaly and no aortic enlargement. Extremities are nonedematous and both femoral and pedal pulses are normal.  Neurologic exam DTRs are equal bilaterally no focal deficits strength is symmetrical heme lymph no palpable lymph nodes in the neck axilla or groin  Small soft tissue mass consistent with lipoma on back  Assessment/Plan   Problem List Items Addressed This Visit       Generalized anxiety disorder    Relevant Medications    escitalopram (Lexapro) 5 mg tablet    Soft tissue mass - Primary    Relevant Orders    Referral to General Surgery            Jerri Krishnan MD

## 2024-04-26 ENCOUNTER — OFFICE VISIT (OUTPATIENT)
Dept: PRIMARY CARE | Facility: CLINIC | Age: 74
End: 2024-04-26
Payer: MEDICARE

## 2024-04-26 VITALS
BODY MASS INDEX: 24.53 KG/M2 | OXYGEN SATURATION: 96 % | TEMPERATURE: 97.3 F | HEART RATE: 91 BPM | HEIGHT: 65 IN | SYSTOLIC BLOOD PRESSURE: 121 MMHG | WEIGHT: 147.2 LBS | DIASTOLIC BLOOD PRESSURE: 76 MMHG

## 2024-04-26 DIAGNOSIS — T78.40XA ALLERGIC REACTION, INITIAL ENCOUNTER: Primary | ICD-10-CM

## 2024-04-26 PROCEDURE — 1159F MED LIST DOCD IN RCRD: CPT | Performed by: NURSE PRACTITIONER

## 2024-04-26 PROCEDURE — 1123F ACP DISCUSS/DSCN MKR DOCD: CPT | Performed by: NURSE PRACTITIONER

## 2024-04-26 PROCEDURE — 1036F TOBACCO NON-USER: CPT | Performed by: NURSE PRACTITIONER

## 2024-04-26 PROCEDURE — 3074F SYST BP LT 130 MM HG: CPT | Performed by: NURSE PRACTITIONER

## 2024-04-26 PROCEDURE — 1157F ADVNC CARE PLAN IN RCRD: CPT | Performed by: NURSE PRACTITIONER

## 2024-04-26 PROCEDURE — 3078F DIAST BP <80 MM HG: CPT | Performed by: NURSE PRACTITIONER

## 2024-04-26 PROCEDURE — 99213 OFFICE O/P EST LOW 20 MIN: CPT | Performed by: NURSE PRACTITIONER

## 2024-04-26 RX ORDER — PREDNISONE 20 MG/1
TABLET ORAL
Qty: 15 TABLET | Refills: 0 | Status: SHIPPED | OUTPATIENT
Start: 2024-04-26

## 2024-04-26 ASSESSMENT — ENCOUNTER SYMPTOMS
CONSTITUTIONAL NEGATIVE: 1
CHEST TIGHTNESS: 0
SHORTNESS OF BREATH: 0
WHEEZING: 0

## 2024-04-26 NOTE — PROGRESS NOTES
Subjective   Patient ID: Josse Owens is a 73 y.o. female who presents for No chief complaint on file..  Last physical: has it scheduled for oct    Current sx- red bloches on face, slight swelling   When did sx start- 19th   Any new meds, otc meds, products, pets, foods? New med- escitalapram   Any sob, wheezing, tight chest? No   Any fever or chills? No     HPI  Started  lexapro 5d ago; rash lf lower abd 4d ago but went away with cortisone  Facial swelling and red rash on face yesterday; had pb sandwich yesterday which she hadn't eaten in 2yrs; used otc med for rosacea but not for a long time  Stopped the med today  Rash dot rt arm and line of rash on lf arm  Very allergic to PI and bees  No other new meds or other new foods or other otc meds; no new  pets  No sob, wheezing or tight chest  No fever or chills  Feels itchy rt chest  Selftxt: cortisone    No care team member to display      Review of Systems   Constitutional: Negative.    Respiratory:  Negative for chest tightness, shortness of breath and wheezing.    Cardiovascular:  Negative for chest pain.   Skin:  Positive for rash.       Objective   There were no vitals taken for this visit.   BP Readings from Last 3 Encounters:   04/19/24 138/85   12/05/23 130/70   10/04/23 128/80     Wt Readings from Last 3 Encounters:   04/19/24 66.5 kg (146 lb 9.6 oz)   12/05/23 66.2 kg (146 lb)   10/04/23 66.2 kg (146 lb)       Physical Exam  Constitutional:       General: She is not in acute distress.     Appearance: Normal appearance. She is not ill-appearing, toxic-appearing or diaphoretic.   Skin:     Comments: Red splotchy rash on face.  Face slightly swollen.  Dot of rash on rt arm and line of rash on lf arm   Neurological:      Mental Status: She is alert.       Assessment/Plan   Diagnoses and all orders for this visit:  Allergic reaction, initial encounter  -     predniSONE (Deltasone) 20 mg tablet; Take 2 tabs daily x 5d then 1 tab daily x 3d then 1/2 tab  daily x 3d

## 2024-04-26 NOTE — PATIENT INSTRUCTIONS
Start prednisone to stop your reaction  No advil, motrin, ibuprofen, aleve, naproxen or other anti-inflammatories while on prednisone.  Tylenol (acetaminophen) is OK to take.   Continue with cortisone cream. Do not get in your eyes  We will call on Monday to make sure you are starting to get better.  To ER if sob, wheezing, difficulty breathing    Keep oct appt    I will communicate with you via [employer]. regarding messages and results. If you need help with this, you can call the support line at [phone].    IT WAS A PLEASURE TO SEE YOU TODAY. THANK YOU FOR CHOOSING US FOR YOUR HEALTHCARE NEEDS.

## 2024-04-29 ENCOUNTER — TELEPHONE (OUTPATIENT)
Dept: PRIMARY CARE | Facility: CLINIC | Age: 74
End: 2024-04-29
Payer: MEDICARE

## 2024-05-08 ENCOUNTER — OFFICE VISIT (OUTPATIENT)
Dept: SURGERY | Facility: CLINIC | Age: 74
End: 2024-05-08
Payer: MEDICARE

## 2024-05-08 DIAGNOSIS — M79.89 SOFT TISSUE MASS: ICD-10-CM

## 2024-05-08 PROCEDURE — 1159F MED LIST DOCD IN RCRD: CPT | Performed by: PHYSICIAN ASSISTANT

## 2024-05-08 PROCEDURE — 1123F ACP DISCUSS/DSCN MKR DOCD: CPT | Performed by: PHYSICIAN ASSISTANT

## 2024-05-08 PROCEDURE — 1157F ADVNC CARE PLAN IN RCRD: CPT | Performed by: PHYSICIAN ASSISTANT

## 2024-05-08 PROCEDURE — 1036F TOBACCO NON-USER: CPT | Performed by: PHYSICIAN ASSISTANT

## 2024-05-08 PROCEDURE — 99203 OFFICE O/P NEW LOW 30 MIN: CPT | Performed by: PHYSICIAN ASSISTANT

## 2024-05-08 NOTE — PROGRESS NOTES
Subjective   Patient ID: Josse Owens is a 73 y.o. female who presents for New Patient Visit and Mass (Lipoma right shoulder).    HPI  This is a 73-year-old female who comes in with a complaint of something on her right shoulder scapula area mass it has been there her massage therapist she has been to 2 different ones have noticed it.  It causes her tenderness when she is getting the massage and with movement of the right shoulder and scapula area      Review of Systems  Review of systems is negative other than what is mentioned above        Physical Exam  Eyes: Conjunctiva non -icteric and eye lids are without obvious rash or drooping. Pupils are symmetric.   Ears, Nose, Mouth, and Throat: External ears and nose appear to be without deformity or rash. No lesions or masses noted. Hearing is grossly intact.   Neck:. No JVD noted, tracheal position is midline. No thyromegaly, no thyroid nodules  Head and Face: Examination of the head and face revealed no abnormalities.   Respiratory: No gasping or shortness of breath noted, no use of accessory muscles noted.   Cardiovascular: Examination for edema is normal  GI: Abdomen non tender to palpation,   Skin: There is a 9 x 10 cm soft tissue mass movable on her right scapula shoulder area  Musk: Digits/nails show no clubbing or cyanosis. No asymmetry or masses noted of the musculature. Examination of the muscles/joints/bones show normal range of motion. Gait is grossly normally.   Neurologic: Cranial nerves II- XII intact, motor strength 5/5 muscle strength of the lower extremities bilaterally and equal.      Objective     Encounter Diagnosis   Name Primary?    Soft tissue mass       Patient Active Problem List   Diagnosis    Seasonal allergies    Gastroesophageal reflux disease    Primary hypertension    Mixed hyperlipidemia    Elevated alkaline phosphatase level    External hemorrhoids    Polyp of colon    Vaginal dryness    Anxiety    Medicare annual wellness visit,  subsequent    Contact dermatitis    Age-related osteoporosis without current pathological fracture    Generalized anxiety disorder      Allergies   Allergen Reactions    Bee Venom Protein (Honey Bee) Anaphylaxis    Egg Unknown    Lexapro [Escitalopram Oxalate] Hives    Penicillins Unknown    Tetracyclines Unknown      Medication Documentation Review Audit       Reviewed by Milagro Rogers MA (Medical Assistant) on 05/08/24 at 1044      Medication Order Taking? Sig Documenting Provider Last Dose Status   amLODIPine (Norvasc) 2.5 mg tablet [id number] No Take 1 tablet (2.5 mg) by mouth once daily. Jerri Krishnan MD Taking Active   escitalopram (Lexapro) 5 mg tablet [id number] No Take 1 tablet (5 mg) by mouth once daily.   Patient not taking: Reported on 4/26/2024    Jerri Krishnan MD Not Taking Active   omeprazole (PriLOSEC) 40 mg DR capsule [id number] No Take 1 capsule (40 mg) by mouth once daily.   Patient not taking: Reported on 4/26/2024    Jerri Krishnan MD Not Taking Active   predniSONE (Deltasone) 20 mg tablet [id number]  Take 2 tabs daily x 5d then 1 tab daily x 3d then 1/2 tab daily x 3d Elizabeth Cueva, APRN-CNP  Active   simvastatin (Zocor) 20 mg tablet [id number] No Take 1 tablet (20 mg) by mouth once daily in the evening. Jerri Krishnan MD Taking Active   triamcinolone (Kenalog) 0.1 % cream [id number] No Apply topically 2 times a day. Apply to affected area 1-2 times daily as needed. Avoid face and groin. Jerri Krishnan MD Taking Active                    Past Medical History:   Diagnosis Date    Acute lower UTI (urinary tract infection) 10/04/2023    Acute rhinosinusitis 10/04/2023    Allergy, unspecified, initial encounter 11/03/2013    Allergic reaction    Cough, unspecified 11/03/2013    Cough    Dehydration 11/03/2013    Dehydration    Dermatitis, unspecified 11/03/2013    Dermatitis, eczematoid    Edema, unspecified 11/03/2013    Edema    Encounter for general adult medical examination  without abnormal findings 11/03/2013    Physical exam, routine    Encounter for gynecological examination (general) (routine) without abnormal findings 11/03/2013    Encounter for routine gynecological examination    Hemangioma of skin and subcutaneous tissue 10/04/2022    Melanocytic nevi of trunk 10/04/2022    Noninfective gastroenteritis and colitis, unspecified 11/03/2013    Noninfectious gastroenteritis    Other fatigue 11/03/2013    Fatigue    Other seborrheic keratosis 10/04/2022    Other specified disorders of veins 11/03/2013    Venous stasis    Personal history of other diseases of the nervous system and sense organs 10/29/2014    History of otitis media    Personal history of peptic ulcer disease     Personal history of gastric ulcer    Personal history of urinary (tract) infections 06/24/2015    History of urinary tract infection    Unspecified abdominal pain 11/03/2013    Abdominal pain    Urinary symptom or sign 10/04/2023     Social History     Tobacco Use   Smoking Status Never   Smokeless Tobacco Never     Family History   Problem Relation Name Age of Onset    Arthritis Mother      Heart disease Father        Past Surgical History:   Procedure Laterality Date    COLONOSCOPY  11/01/2017    Colonoscopy (Fiberoptic)    OTHER SURGICAL HISTORY  11/13/2019    Colonoscopy    OTHER SURGICAL HISTORY  11/03/2020    Cataract surgery    OTHER SURGICAL HISTORY  11/03/2020    Corneal lasik    TONSILLECTOMY  10/29/2014    Tonsillectomy       Assessment/Plan   Today we had a discussion about excision of soft tissue mass right shoulder/scapula area.  Patient was instructed that this would be done as an outpatient surgery would take about 1 hour and require general anesthesia.  Patient was instructed mass once excised will be sent to pathology for review.  Risk and benefits such as bleeding and infection were discussed.  Alternatives treatments were discussed.  All questions were answered.  Patient would like to  proceed.     Encounter Diagnosis   Name Primary?    Soft tissue mass        I have reviewed all data including labs,radiologic and previous reports.    **Portions of this medical record have been created using voice recognition software and may have minor errors which are inherent in voice recognition systems. It has not been fully edited for typographical or grammatical errors**

## 2024-05-12 PROBLEM — M79.89 SOFT TISSUE MASS: Status: ACTIVE | Noted: 2024-05-12

## 2024-06-19 ENCOUNTER — APPOINTMENT (OUTPATIENT)
Dept: PRIMARY CARE | Facility: CLINIC | Age: 74
End: 2024-06-19
Payer: MEDICARE

## 2024-06-27 ENCOUNTER — APPOINTMENT (OUTPATIENT)
Dept: SURGERY | Facility: CLINIC | Age: 74
End: 2024-06-27
Payer: MEDICARE

## 2024-06-27 DIAGNOSIS — D17.1 LIPOMA OF TORSO: Primary | ICD-10-CM

## 2024-06-27 PROCEDURE — 1036F TOBACCO NON-USER: CPT | Performed by: PHYSICIAN ASSISTANT

## 2024-06-27 PROCEDURE — 1159F MED LIST DOCD IN RCRD: CPT | Performed by: PHYSICIAN ASSISTANT

## 2024-06-27 PROCEDURE — 1157F ADVNC CARE PLAN IN RCRD: CPT | Performed by: PHYSICIAN ASSISTANT

## 2024-06-27 PROCEDURE — 1123F ACP DISCUSS/DSCN MKR DOCD: CPT | Performed by: PHYSICIAN ASSISTANT

## 2024-06-27 PROCEDURE — 99024 POSTOP FOLLOW-UP VISIT: CPT | Performed by: PHYSICIAN ASSISTANT

## 2024-06-27 NOTE — PROGRESS NOTES
Subjective   Patient ID: Josse Owens is a 73 y.o. female who presents for Post-op (Excision STM right shoulder done on 6/13/24).    HPI  73-year-old female status post excision soft tissue mass right posterior shoulder.  Patient is here for postoperative checkup.  She is doing well she has no complaints      Review of Systems  Review of systems is negative other than what is mentioned above          Physical Exam  Eyes: Conjunctiva non -icteric and eye lids are without obvious rash or drooping. Pupils are symmetric.   Ears, Nose, Mouth, and Throat: External ears and nose appear to be without deformity or rash. No lesions or masses noted. Hearing is grossly intact.   Neck:. No JVD noted, tracheal position is midline. No thyromegaly, no thyroid nodules  Head and Face: Examination of the head and face revealed no abnormalities.   Respiratory: No gasping or shortness of breath noted, no use of accessory muscles noted.  Lungs are clear to auscultate  Cardiovascular: Examination for edema is normal, regular rate and rhythm S1-S2  GI: Abdomen non tender to palpation, bowel sounds are present  Skin: Incisions clean dry and intact Steri-Strips removed today  Musk: Digits/nails show no clubbing or cyanosis. No asymmetry or masses noted of the musculature. Examination of the muscles/joints/bones show normal range of motion. Gait is grossly normally.   Neurologic: Cranial nerves II- XII intact, motor strength 5/5 muscle strength of the lower extremities bilaterally and equal.      Objective     No diagnosis found.   Patient Active Problem List   Diagnosis    Seasonal allergies    Gastroesophageal reflux disease    Primary hypertension    Mixed hyperlipidemia    Elevated alkaline phosphatase level    External hemorrhoids    Polyp of colon    Vaginal dryness    Anxiety    Medicare annual wellness visit, subsequent    Contact dermatitis    Age-related osteoporosis without current pathological fracture    Generalized anxiety  disorder    Soft tissue mass      Allergies   Allergen Reactions    Bee Venom Protein (Honey Bee) Anaphylaxis    Egg Unknown    Lexapro [Escitalopram Oxalate] Hives    Penicillins Unknown    Tetracyclines Unknown      Medication Documentation Review Audit       Reviewed by Milagro Rogers MA (Medical Assistant) on 06/27/24 at 1256      Medication Order Taking? Sig Documenting Provider Last Dose Status   amLODIPine (Norvasc) 2.5 mg tablet [id number] No Take 1 tablet (2.5 mg) by mouth once daily. Jerri Krishnan MD Taking Active   escitalopram (Lexapro) 5 mg tablet [id number] No Take 1 tablet (5 mg) by mouth once daily.   Patient not taking: Reported on 4/26/2024    Jerri Krishnan MD Not Taking Active   omeprazole (PriLOSEC) 40 mg DR capsule [id number] No Take 1 capsule (40 mg) by mouth once daily.   Patient not taking: Reported on 4/26/2024    Jerri Krishnan MD Not Taking Active   predniSONE (Deltasone) 20 mg tablet [id number]  Take 2 tabs daily x 5d then 1 tab daily x 3d then 1/2 tab daily x 3d Elizabeth Cueva, APRN-CNP  Active   simvastatin (Zocor) 20 mg tablet [id number] No Take 1 tablet (20 mg) by mouth once daily in the evening. Jerri Krishnan MD Taking Active   triamcinolone (Kenalog) 0.1 % cream [id number] No Apply topically 2 times a day. Apply to affected area 1-2 times daily as needed. Avoid face and groin. Jerri Krishnan MD Taking Active                    Past Medical History:   Diagnosis Date    Acute lower UTI (urinary tract infection) 10/04/2023    Acute rhinosinusitis 10/04/2023    Allergy, unspecified, initial encounter 11/03/2013    Allergic reaction    Cough, unspecified 11/03/2013    Cough    Dehydration 11/03/2013    Dehydration    Dermatitis, unspecified 11/03/2013    Dermatitis, eczematoid    Edema, unspecified 11/03/2013    Edema    Encounter for general adult medical examination without abnormal findings 11/03/2013    Physical exam, routine    Encounter for gynecological examination  (general) (routine) without abnormal findings 11/03/2013    Encounter for routine gynecological examination    Hemangioma of skin and subcutaneous tissue 10/04/2022    Melanocytic nevi of trunk 10/04/2022    Noninfective gastroenteritis and colitis, unspecified 11/03/2013    Noninfectious gastroenteritis    Other fatigue 11/03/2013    Fatigue    Other seborrheic keratosis 10/04/2022    Other specified disorders of veins 11/03/2013    Venous stasis    Personal history of other diseases of the nervous system and sense organs 10/29/2014    History of otitis media    Personal history of peptic ulcer disease     Personal history of gastric ulcer    Personal history of urinary (tract) infections 06/24/2015    History of urinary tract infection    Unspecified abdominal pain 11/03/2013    Abdominal pain    Urinary symptom or sign 10/04/2023     Social History     Tobacco Use   Smoking Status Never   Smokeless Tobacco Never     Family History   Problem Relation Name Age of Onset    Arthritis Mother      Heart disease Father        Past Surgical History:   Procedure Laterality Date    COLONOSCOPY  11/01/2017    Colonoscopy (Fiberoptic)    OTHER SURGICAL HISTORY  11/13/2019    Colonoscopy    OTHER SURGICAL HISTORY  11/03/2020    Cataract surgery    OTHER SURGICAL HISTORY  11/03/2020    Corneal lasik    TONSILLECTOMY  10/29/2014    Tonsillectomy       Assessment/Plan   Resume normal activity  Pathology reviewed lipoma    Encounter Diagnosis   Name Primary?    Lipoma of torso Yes     I have reviewed all data including labs,radiologic and previous reports.      **Portions of this medical record have been created using voice recognition software and may have minor errors which are inherent in voice recognition systems. It has not been fully edited for typographical or grammatical errors**

## 2024-08-01 DIAGNOSIS — L25.9 CONTACT DERMATITIS, UNSPECIFIED CONTACT DERMATITIS TYPE, UNSPECIFIED TRIGGER: ICD-10-CM

## 2024-08-01 RX ORDER — TRIAMCINOLONE ACETONIDE 1 MG/G
CREAM TOPICAL 2 TIMES DAILY
Qty: 30 G | Refills: 2 | Status: SHIPPED | OUTPATIENT
Start: 2024-08-01

## 2024-09-18 ENCOUNTER — APPOINTMENT (OUTPATIENT)
Dept: PRIMARY CARE | Facility: CLINIC | Age: 74
End: 2024-09-18
Payer: MEDICARE

## 2024-09-18 VITALS
WEIGHT: 144 LBS | HEIGHT: 65 IN | SYSTOLIC BLOOD PRESSURE: 138 MMHG | BODY MASS INDEX: 23.99 KG/M2 | HEART RATE: 84 BPM | DIASTOLIC BLOOD PRESSURE: 78 MMHG | TEMPERATURE: 97.9 F

## 2024-09-18 DIAGNOSIS — Z01.818 PREOPERATIVE CLEARANCE: Primary | ICD-10-CM

## 2024-09-18 DIAGNOSIS — Z23 ENCOUNTER FOR IMMUNIZATION: ICD-10-CM

## 2024-09-18 PROCEDURE — 1123F ACP DISCUSS/DSCN MKR DOCD: CPT | Performed by: FAMILY MEDICINE

## 2024-09-18 PROCEDURE — 3075F SYST BP GE 130 - 139MM HG: CPT | Performed by: FAMILY MEDICINE

## 2024-09-18 PROCEDURE — 1159F MED LIST DOCD IN RCRD: CPT | Performed by: FAMILY MEDICINE

## 2024-09-18 PROCEDURE — 90662 IIV NO PRSV INCREASED AG IM: CPT | Performed by: FAMILY MEDICINE

## 2024-09-18 PROCEDURE — 3078F DIAST BP <80 MM HG: CPT | Performed by: FAMILY MEDICINE

## 2024-09-18 PROCEDURE — G0008 ADMIN INFLUENZA VIRUS VAC: HCPCS | Performed by: FAMILY MEDICINE

## 2024-09-18 PROCEDURE — 3008F BODY MASS INDEX DOCD: CPT | Performed by: FAMILY MEDICINE

## 2024-09-18 PROCEDURE — 1157F ADVNC CARE PLAN IN RCRD: CPT | Performed by: FAMILY MEDICINE

## 2024-09-18 PROCEDURE — 99213 OFFICE O/P EST LOW 20 MIN: CPT | Performed by: FAMILY MEDICINE

## 2024-09-18 ASSESSMENT — PATIENT HEALTH QUESTIONNAIRE - PHQ9
1. LITTLE INTEREST OR PLEASURE IN DOING THINGS: NOT AT ALL
2. FEELING DOWN, DEPRESSED OR HOPELESS: NOT AT ALL
SUM OF ALL RESPONSES TO PHQ9 QUESTIONS 1 AND 2: 0

## 2024-09-18 NOTE — PROGRESS NOTES
"Subjective   Patient ID: Josse Owens is a 74 y.o. female who presents for surgery clearance .  HPI    Review of Systems  Constitutional: no chills, no fever and no night sweats.   Eyes: no blurred vision and no eyesight problems.   ENT: no hearing loss, no nasal congestion, no nasal discharge, no hoarseness and no sore throat.   Cardiovascular: no chest pain, no intermittent leg claudication, no lower extremity edema, no palpitations and no syncope.   Respiratory: no cough, no shortness of breath during exertion, no shortness of breath at rest and no wheezing.   Gastrointestinal: no abdominal pain, no blood in stools, no constipation, no diarrhea, no melena, no nausea, no rectal pain and no vomiting.   Genitourinary: no dysuria, no change in urinary frequency, no urinary hesitancy, no feelings of urinary urgency and no vaginal discharge.   Musculoskeletal: no arthralgias,  no back pain and no myalgias.   Integumentary: no new skin lesions and no rashes.   Neurological: no difficulty walking, no headache, no limb weakness, no numbness and no tingling.   Psychiatric: no anxiety, no depression, no anhedonia and no substance use disorders.   Endocrine: no recent weight gain and no recent weight loss.   Hematologic/Lymphatic: no tendency for easy bruising and no swollen glands .    Objective    /78   Pulse 84   Temp 36.6 °C (97.9 °F)   Ht 1.651 m (5' 5\")   Wt 65.3 kg (144 lb)   BMI 23.96 kg/m²    Physical Exam  The patient appeared well nourished and normally developed. Vital signs as documented. Head exam is unremarkable. No scleral icterus or corneal arcus noted.  Pupils are equal round reactive to light extraocular movements are intact no hemorrhages noted on funduscopic exam mouth mucous membranes are moist no exudates ears canals clear TMs are gray pearly not injected nose no rhinorrhea or epistaxis Neck is without jugular venous distension, thyromegaly, or carotid bruits. Carotid upstrokes are " brisk bilaterally. Lungs are clear to auscultation and percussion. Cardiac exam reveals the PMI to be normally sized and situated. Rhythm is regular. First and second heart sounds normal. No murmurs, rubs or gallops. Abdominal exam reveals normal bowel sounds, no masses, no organomegaly and no aortic enlargement. Extremities are nonedematous and both femoral and pedal pulses are normal.  Neurologic exam DTRs are equal bilaterally no focal deficits strength is symmetrical heme lymph no palpable lymph nodes in the neck axilla or groin    Assessment/Plan   Problem List Items Addressed This Visit       Preoperative clearance - Primary     Other Visit Diagnoses       Encounter for immunization        Relevant Orders    Flu vaccine, trivalent, preservative free, HIGH-DOSE, age 65y+ (Fluzone) (Completed)                 Jerri Krishnan MD

## 2024-09-22 DIAGNOSIS — E78.2 MIXED HYPERLIPIDEMIA: ICD-10-CM

## 2024-09-22 DIAGNOSIS — I10 PRIMARY HYPERTENSION: ICD-10-CM

## 2024-09-23 RX ORDER — SIMVASTATIN 20 MG/1
20 TABLET, FILM COATED ORAL EVERY EVENING
Qty: 90 TABLET | Refills: 0 | Status: SHIPPED | OUTPATIENT
Start: 2024-09-23

## 2024-09-23 RX ORDER — AMLODIPINE BESYLATE 2.5 MG/1
2.5 TABLET ORAL DAILY
Qty: 90 TABLET | Refills: 0 | Status: SHIPPED | OUTPATIENT
Start: 2024-09-23

## 2024-09-26 ENCOUNTER — APPOINTMENT (OUTPATIENT)
Dept: DERMATOLOGY | Facility: CLINIC | Age: 74
End: 2024-09-26
Payer: MEDICARE

## 2024-09-26 DIAGNOSIS — L82.1 SEBORRHEIC KERATOSIS: ICD-10-CM

## 2024-09-26 DIAGNOSIS — L71.9 ROSACEA: ICD-10-CM

## 2024-09-26 DIAGNOSIS — L98.8 RHYTIDES: ICD-10-CM

## 2024-09-26 DIAGNOSIS — B35.1 ONYCHOMYCOSIS: ICD-10-CM

## 2024-09-26 DIAGNOSIS — L81.4 LENTIGO: ICD-10-CM

## 2024-09-26 DIAGNOSIS — D22.9 NEVUS: Primary | ICD-10-CM

## 2024-09-26 PROCEDURE — 1123F ACP DISCUSS/DSCN MKR DOCD: CPT | Performed by: NURSE PRACTITIONER

## 2024-09-26 PROCEDURE — 1159F MED LIST DOCD IN RCRD: CPT | Performed by: NURSE PRACTITIONER

## 2024-09-26 PROCEDURE — 1157F ADVNC CARE PLAN IN RCRD: CPT | Performed by: NURSE PRACTITIONER

## 2024-09-26 PROCEDURE — 1036F TOBACCO NON-USER: CPT | Performed by: NURSE PRACTITIONER

## 2024-09-26 PROCEDURE — 99214 OFFICE O/P EST MOD 30 MIN: CPT | Performed by: NURSE PRACTITIONER

## 2024-09-26 RX ORDER — CICLOPIROX 80 MG/ML
SOLUTION TOPICAL NIGHTLY
Qty: 6.6 ML | Refills: 11 | Status: SHIPPED | OUTPATIENT
Start: 2024-09-26

## 2024-09-26 RX ORDER — METRONIDAZOLE 7.5 MG/G
1 CREAM TOPICAL DAILY
Qty: 45 G | Refills: 0 | Status: SHIPPED | OUTPATIENT
Start: 2024-09-26 | End: 2025-09-26

## 2024-09-26 RX ORDER — TRETINOIN 0.5 MG/G
CREAM TOPICAL
Qty: 20 G | Refills: 1 | Status: SHIPPED | OUTPATIENT
Start: 2024-09-26

## 2024-09-26 NOTE — ADDENDUM NOTE
Addended by: PRIETO ZAMORA on: 9/26/2024 12:18 PM     Modules accepted: Orders, Level of Service

## 2024-09-26 NOTE — PROGRESS NOTES
Subjective     Josse Owens is a 74 y.o. female who presents for the following: multiple lesions.   Established patient in today for multiple lesions to the right leg more raised and rough in texture, left great toe, nose and mid back.    Review of Systems:  No other skin or systemic complaints other than what is documented elsewhere in the note.    The following portions of the chart were reviewed this encounter and updated as appropriate:       Skin Cancer History  No skin cancer on file.    Specialty Problems          Dermatology Problems    Contact dermatitis     Past Medical History:  Josse Owens  has a past medical history of Acute lower UTI (urinary tract infection) (10/04/2023), Acute rhinosinusitis (10/04/2023), Allergy, unspecified, initial encounter (11/03/2013), Cough, unspecified (11/03/2013), Dehydration (11/03/2013), Dermatitis, unspecified (11/03/2013), Edema, unspecified (11/03/2013), Encounter for general adult medical examination without abnormal findings (11/03/2013), Encounter for gynecological examination (general) (routine) without abnormal findings (11/03/2013), Hemangioma of skin and subcutaneous tissue (10/04/2022), Melanocytic nevi of trunk (10/04/2022), Noninfective gastroenteritis and colitis, unspecified (11/03/2013), Other fatigue (11/03/2013), Other seborrheic keratosis (10/04/2022), Other specified disorders of veins (11/03/2013), Personal history of other diseases of the nervous system and sense organs (10/29/2014), Personal history of peptic ulcer disease, Personal history of urinary (tract) infections (06/24/2015), Unspecified abdominal pain (11/03/2013), and Urinary symptom or sign (10/04/2023).    Past Surgical History:  Josse Owens  has a past surgical history that includes Other surgical history (11/13/2019); Tonsillectomy (10/29/2014); Colonoscopy (11/01/2017); Other surgical history (11/03/2020); and Other surgical history (11/03/2020).    Family History:   Patient family history includes Arthritis in her mother; Heart disease in her father.    Social History:  Josse Owens  reports that she has never smoked. She has never used smokeless tobacco. She reports that she does not drink alcohol and does not use drugs.    Allergies:  Bee venom protein (honey bee), Egg, Lexapro [escitalopram oxalate], Penicillins, and Tetracyclines    Current Medications / CAM's:    Current Outpatient Medications:     amLODIPine (Norvasc) 2.5 mg tablet, TAKE 1 TABLET BY MOUTH ONE TIME DAILY, Disp: 90 tablet, Rfl: 0    ciclopirox (Penlac) 8 % solution, Apply topically once daily at bedtime., Disp: 6.6 mL, Rfl: 11    escitalopram (Lexapro) 5 mg tablet, Take 1 tablet (5 mg) by mouth once daily., Disp: 90 tablet, Rfl: 3    metroNIDAZOLE (Metrocream) 0.75 % cream, Apply 1 Application topically once daily., Disp: 45 g, Rfl: 0    omeprazole (PriLOSEC) 40 mg DR capsule, Take 1 capsule (40 mg) by mouth once daily., Disp: 90 capsule, Rfl: 3    predniSONE (Deltasone) 20 mg tablet, Take 2 tabs daily x 5d then 1 tab daily x 3d then 1/2 tab daily x 3d (Patient not taking: Reported on 9/18/2024), Disp: 15 tablet, Rfl: 0    simvastatin (Zocor) 20 mg tablet, TAKE ONE TABLET BY MOUTH IN THE EVENING, Disp: 90 tablet, Rfl: 0    tretinoin (Retin-A) 0.05 % cream, Apply a thin layer to affected area at bedtime as tolerated., Disp: 20 g, Rfl: 1    triamcinolone (Kenalog) 0.1 % cream, Apply topically 2 times a day. Apply to affected area 1-2 times daily as needed. Avoid face and groin., Disp: 30 g, Rfl: 2     Objective   Well appearing patient in no apparent distress; mood and affect are within normal limits.      Assessment/Plan   1. Nevus (2)  Left Lower Back, Right Upper Back  Uniform pigmented macule(s)/papule(s) with reassuring findings on dermoscopy    -Discussed nature of condition  -Reassurance, benign-appearing features on examination today  -Recommend continued observation    2. Lentigo (2)  Left  Thigh - Anterior, Left Thigh - Posterior  Tan macules    -Benign appearing on exam  -Reassurance, recommend observation    3. Seborrheic keratosis  Right Thigh - Anterior  Stuck on, waxy macule(s)/papule(s)/plaque(s) with comedo-like openings and milia like cysts    -Discussed nature of condition  -Reassurance, recommend continued observation    4. Onychomycosis  Left 5th Toenail  Thickened, discolored nail plate(s) with subungual debris     -Discussed nature of the condition  -This is a chronic issue that is often difficult to treat and will often recur once treated  -Reviewed treatment options    ciclopirox (Penlac) 8 % solution - Left 5th Toenail  Apply topically once daily at bedtime.    5. Rhytides  Head - Anterior (Face)  Dynamic and/or static rhytids, lentigines, changes associated with chronic sun exposure    tretinoin (Retin-A) 0.05 % cream - Head - Anterior (Face)  Apply a thin layer to affected area at bedtime as tolerated.    6. Rosacea  Head - Anterior (Face)  Erythema and telangiectasias    -Discussed nature of this chronic condition  -Recommend gentle skin care habits including gentle cleansers, non-comedogenic physical/mineral based sunscreen daily. Avoid exfoliation, wind and extreme temperatures when possible.    metroNIDAZOLE (Metrocream) 0.75 % cream - Head - Anterior (Face)  Apply 1 Application topically once daily.

## 2024-10-09 ENCOUNTER — APPOINTMENT (OUTPATIENT)
Dept: PRIMARY CARE | Facility: CLINIC | Age: 74
End: 2024-10-09
Payer: MEDICARE

## 2024-10-09 VITALS
HEART RATE: 80 BPM | SYSTOLIC BLOOD PRESSURE: 138 MMHG | TEMPERATURE: 98 F | HEIGHT: 65 IN | DIASTOLIC BLOOD PRESSURE: 80 MMHG | WEIGHT: 143.2 LBS | BODY MASS INDEX: 23.86 KG/M2

## 2024-10-09 DIAGNOSIS — Z00.00 MEDICARE ANNUAL WELLNESS VISIT, SUBSEQUENT: Primary | ICD-10-CM

## 2024-10-09 DIAGNOSIS — Z12.31 ENCOUNTER FOR SCREENING MAMMOGRAM FOR BREAST CANCER: ICD-10-CM

## 2024-10-09 DIAGNOSIS — E78.2 MIXED HYPERLIPIDEMIA: ICD-10-CM

## 2024-10-09 DIAGNOSIS — I10 PRIMARY HYPERTENSION: ICD-10-CM

## 2024-10-09 LAB
NON-UH HIE A/G RATIO: 1.3
NON-UH HIE ALB: 4.1 G/DL (ref 3.4–5)
NON-UH HIE ALK PHOS: 106 UNIT/L (ref 45–117)
NON-UH HIE BASO COUNT: 0.08 X1000 (ref 0–0.2)
NON-UH HIE BASOS %: 1 %
NON-UH HIE BILIRUBIN, TOTAL: 0.6 MG/DL (ref 0.3–1.2)
NON-UH HIE BUN/CREAT RATIO: 15
NON-UH HIE BUN: 12 MG/DL (ref 9–23)
NON-UH HIE CALCIUM: 10.2 MG/DL (ref 8.7–10.4)
NON-UH HIE CALCULATED LDL CHOLESTEROL: 110 MG/DL (ref 60–130)
NON-UH HIE CALCULATED OSMOLALITY: 285 MOSM/KG (ref 275–295)
NON-UH HIE CHLORIDE: 107 MMOL/L (ref 98–107)
NON-UH HIE CHOLESTEROL: 198 MG/DL (ref 100–200)
NON-UH HIE CO2, VENOUS: 28 MMOL/L (ref 20–31)
NON-UH HIE CREATININE: 0.8 MG/DL (ref 0.5–0.8)
NON-UH HIE DIFF?: NO
NON-UH HIE EOS COUNT: 0.1 X1000 (ref 0–0.5)
NON-UH HIE EOSIN %: 1.4 %
NON-UH HIE GFR AA: >60
NON-UH HIE GLOBULIN: 3.2 G/DL
NON-UH HIE GLOMERULAR FILTRATION RATE: >60 ML/MIN/1.73M?
NON-UH HIE GLUCOSE: 103 MG/DL (ref 74–106)
NON-UH HIE GOT: 23 UNIT/L (ref 15–37)
NON-UH HIE GPT: 19 UNIT/L (ref 10–49)
NON-UH HIE HCT: 40.5 % (ref 36–46)
NON-UH HIE HDL CHOLESTEROL: 64 MG/DL (ref 40–60)
NON-UH HIE HGB: 13.3 G/DL (ref 12–16)
NON-UH HIE INSTR WBC: 7.4
NON-UH HIE K: 4.7 MMOL/L (ref 3.5–5.1)
NON-UH HIE LYMPH %: 21 %
NON-UH HIE LYMPH COUNT: 1.55 X1000 (ref 1.2–4.8)
NON-UH HIE MCH: 28.1 PG (ref 27–34)
NON-UH HIE MCHC: 32.8 G/DL (ref 32–37)
NON-UH HIE MCV: 85.9 FL (ref 80–100)
NON-UH HIE MONO %: 7 %
NON-UH HIE MONO COUNT: 0.52 X1000 (ref 0.1–1)
NON-UH HIE MPV: 8.2 FL (ref 7.4–10.4)
NON-UH HIE NA: 143 MMOL/L (ref 135–145)
NON-UH HIE NEUTROPHIL %: 69.5 %
NON-UH HIE NEUTROPHIL COUNT (ANC): 5.11 X1000 (ref 1.4–8.8)
NON-UH HIE NUCLEATED RBC: 0 /100WBC
NON-UH HIE PLATELET: 337 X10 (ref 150–450)
NON-UH HIE RBC: 4.72 X10 (ref 4.2–5.4)
NON-UH HIE RDW: 14.2 % (ref 11.5–14.5)
NON-UH HIE TOTAL CHOL/HDL CHOL RATIO: 3.1
NON-UH HIE TOTAL PROTEIN: 7.3 G/DL (ref 5.7–8.2)
NON-UH HIE TRIGLYCERIDES: 119 MG/DL (ref 30–150)
NON-UH HIE WBC: 7.4 X10 (ref 4.5–11)

## 2024-10-09 RX ORDER — SIMVASTATIN 20 MG/1
20 TABLET, FILM COATED ORAL EVERY EVENING
Qty: 90 TABLET | Refills: 3 | Status: SHIPPED | OUTPATIENT
Start: 2024-10-09

## 2024-10-09 RX ORDER — AMLODIPINE BESYLATE 2.5 MG/1
2.5 TABLET ORAL DAILY
Qty: 90 TABLET | Refills: 3 | Status: SHIPPED | OUTPATIENT
Start: 2024-10-09

## 2024-10-09 ASSESSMENT — ACTIVITIES OF DAILY LIVING (ADL)
BATHING: INDEPENDENT
MANAGING_FINANCES: INDEPENDENT
DRESSING: INDEPENDENT
DOING_HOUSEWORK: INDEPENDENT
TAKING_MEDICATION: INDEPENDENT
GROCERY_SHOPPING: INDEPENDENT

## 2024-10-09 ASSESSMENT — PATIENT HEALTH QUESTIONNAIRE - PHQ9
2. FEELING DOWN, DEPRESSED OR HOPELESS: NOT AT ALL
1. LITTLE INTEREST OR PLEASURE IN DOING THINGS: NOT AT ALL
SUM OF ALL RESPONSES TO PHQ9 QUESTIONS 1 AND 2: 0

## 2024-10-09 NOTE — PROGRESS NOTES
Subjective   Reason for Visit: Josse Owens is an 74 y.o. female here for a Medicare Wellness visit.     Past Medical, Surgical, and Family History reviewed and updated in chart.    Reviewed all medications by prescribing practitioner or clinical pharmacist (such as prescriptions, OTCs, herbal therapies and supplements) and documented in the medical record.    HPI    Patient Self Assessment of Health Status  Patient Self Assessment: Good    Nutrition and Exercise  Current Diet: Well Balanced Diet  Adequate Fluid Intake: Yes  Caffeine: Yes  Exercise Frequency: Regularly    Functional Ability/Level of Safety  Cognitive Impairment Observed: No cognitive impairment observed    Home Safety Risk Factors: None         Patient Care Team:  Jerri Krishnan MD as PCP - General  Jerri Krishnan MD as PCP - [facility] Attributed Provider     Review of Systems  Constitutional: no chills, no fever and no night sweats.   Eyes: no blurred vision and no eyesight problems.   ENT: no hearing loss, no nasal congestion, no nasal discharge, no hoarseness and no sore throat.   Cardiovascular: no chest pain, no intermittent leg claudication, no lower extremity edema, no palpitations and no syncope.   Respiratory: no cough, no shortness of breath during exertion, no shortness of breath at rest and no wheezing.   Gastrointestinal: no abdominal pain, no blood in stools, no constipation, no diarrhea, no melena, no nausea, no rectal pain and no vomiting.   Genitourinary: no dysuria, no change in urinary frequency, no urinary hesitancy, no feelings of urinary urgency and no vaginal discharge.   Musculoskeletal: no arthralgias,  no back pain and no myalgias.   Integumentary: no new skin lesions and no rashes.   Neurological: no difficulty walking, no headache, no limb weakness, no numbness and no tingling.   Psychiatric: no anxiety, no depression, no anhedonia and no substance use disorders.   Endocrine: no recent weight gain and no recent  "weight loss.   Hematologic/Lymphatic: no tendency for easy bruising and no swollen glands .    Medicare Wellness Billing Compliance Satisfied    *This is a visual tool to show completion of required items on the day of the visit. Green checks will only appear on the date of visit.      Objective   Vitals:  /80   Pulse 80   Temp 36.7 °C (98 °F)   Ht 1.651 m (5' 5\")   Wt 65 kg (143 lb 3.2 oz)   BMI 23.83 kg/m²       Physical Exam  The patient appeared well nourished and normally developed. Vital signs as documented. Head exam is unremarkable. No scleral icterus or corneal arcus noted.  Pupils are equal round reactive to light extraocular movements are intact no hemorrhages noted on funduscopic exam mouth mucous membranes are moist no exudates ears canals clear TMs are gray pearly not injected nose no rhinorrhea or epistaxis Neck is without jugular venous distension, thyromegaly, or carotid bruits. Carotid upstrokes are brisk bilaterally. Lungs are clear to auscultation and percussion. Cardiac exam reveals the PMI to be normally sized and situated. Rhythm is regular. First and second heart sounds normal. No murmurs, rubs or gallops. Abdominal exam reveals normal bowel sounds, no masses, no organomegaly and no aortic enlargement. Extremities are nonedematous and both femoral and pedal pulses are normal.  Neurologic exam DTRs are equal bilaterally no focal deficits strength is symmetrical heme lymph no palpable lymph nodes in the neck axilla or groin    Assessment/Plan   Problem List Items Addressed This Visit       Primary hypertension    Relevant Medications    amLODIPine (Norvasc) 2.5 mg tablet    Other Relevant Orders    Lipid panel    CBC and Auto Differential    Mixed hyperlipidemia    Relevant Medications    simvastatin (Zocor) 20 mg tablet    Other Relevant Orders    Comprehensive metabolic panel    Medicare annual wellness visit, subsequent - Primary     Other Visit Diagnoses       Encounter for " screening mammogram for breast cancer        Relevant Orders    BI mammo bilateral screening tomosynthesis

## 2024-10-10 ENCOUNTER — TELEPHONE (OUTPATIENT)
Dept: PRIMARY CARE | Facility: CLINIC | Age: 74
End: 2024-10-10
Payer: MEDICARE

## 2024-10-10 NOTE — TELEPHONE ENCOUNTER
----- Message from Jerri Krishnan sent at 10/10/2024 12:01 PM EDT -----  Please call patient and tell her that her blood work looks good her cholesterol is 198 her complete blood count shows no anemia leukemia or infection and her liver and kidney function and electrolytes are all normal  We can repeat the blood work in 1 year

## 2024-10-10 NOTE — RESULT ENCOUNTER NOTE
Please call patient and tell her that her blood work looks good her cholesterol is 198 her complete blood count shows no anemia leukemia or infection and her liver and kidney function and electrolytes are all normal  We can repeat the blood work in 1 year

## 2024-10-14 ENCOUNTER — APPOINTMENT (OUTPATIENT)
Dept: DERMATOLOGY | Facility: CLINIC | Age: 74
End: 2024-10-14
Payer: MEDICARE

## 2024-11-24 NOTE — RESULT ENCOUNTER NOTE
Please call patient and tell her that her mammogram is negative with no sign of cancer at this time and that she can repeat in 1 year

## 2024-11-25 ENCOUNTER — TELEPHONE (OUTPATIENT)
Dept: PRIMARY CARE | Facility: CLINIC | Age: 74
End: 2024-11-25
Payer: MEDICARE

## 2024-11-25 NOTE — TELEPHONE ENCOUNTER
----- Message from Jerri Krishnan sent at 11/24/2024  1:55 PM EST -----  Please call patient and tell her that her mammogram is negative with no sign of cancer at this time and that she can repeat in 1 year

## 2024-12-18 DIAGNOSIS — L98.8 RHYTIDES: ICD-10-CM

## 2024-12-19 RX ORDER — TRETINOIN 0.5 MG/G
CREAM TOPICAL
Qty: 20 G | Refills: 0 | Status: SHIPPED | OUTPATIENT
Start: 2024-12-19

## 2025-04-28 ENCOUNTER — APPOINTMENT (OUTPATIENT)
Dept: DERMATOLOGY | Facility: CLINIC | Age: 75
End: 2025-04-28
Payer: MEDICARE

## 2025-04-28 DIAGNOSIS — Z12.83 SCREENING EXAM FOR SKIN CANCER: ICD-10-CM

## 2025-04-28 DIAGNOSIS — D22.9 NEVUS: Primary | ICD-10-CM

## 2025-04-28 DIAGNOSIS — L98.8 RHYTIDES: ICD-10-CM

## 2025-04-28 DIAGNOSIS — L82.1 SEBORRHEIC KERATOSIS: ICD-10-CM

## 2025-04-28 DIAGNOSIS — D18.01 CHERRY ANGIOMA: ICD-10-CM

## 2025-04-28 DIAGNOSIS — L57.0 ACTINIC KERATOSIS: ICD-10-CM

## 2025-04-28 DIAGNOSIS — L71.9 ROSACEA: ICD-10-CM

## 2025-04-28 DIAGNOSIS — L81.4 LENTIGO: ICD-10-CM

## 2025-04-28 DIAGNOSIS — B35.1 ONYCHOMYCOSIS: ICD-10-CM

## 2025-04-28 PROCEDURE — 1157F ADVNC CARE PLAN IN RCRD: CPT | Performed by: NURSE PRACTITIONER

## 2025-04-28 PROCEDURE — 99214 OFFICE O/P EST MOD 30 MIN: CPT | Performed by: NURSE PRACTITIONER

## 2025-04-28 PROCEDURE — 1036F TOBACCO NON-USER: CPT | Performed by: NURSE PRACTITIONER

## 2025-04-28 PROCEDURE — 1159F MED LIST DOCD IN RCRD: CPT | Performed by: NURSE PRACTITIONER

## 2025-04-28 PROCEDURE — 1123F ACP DISCUSS/DSCN MKR DOCD: CPT | Performed by: NURSE PRACTITIONER

## 2025-04-28 NOTE — Clinical Note
-Discussed nature of this chronic condition  -Recommend gentle skin care habits including gentle cleansers, non-comedogenic physical/mineral based sunscreen daily. Avoid exfoliation, wind and extreme temperatures when possible.

## 2025-04-28 NOTE — Clinical Note
-Discussed nature of the condition  -This is a chronic issue that is often difficult to treat and will often recur once treated  -Reviewed treatment options

## 2025-04-28 NOTE — PROGRESS NOTES
Subjective     Josse Owens is a 74 y.o. female who presents for the following: Skin Check, Rosacea, and Nail Problem.   Established patient in for yearly full body skin exam.   Rosacea- face- using metronidazole 0.75%   Nail problem-penlac 8%    Review of Systems:  No other skin or systemic complaints other than what is documented elsewhere in the note.    The following portions of the chart were reviewed this encounter and updated as appropriate:       Skin Cancer History  Biopsy Log Book  No skin cancers from Specimen Tracking.    Additional History      Specialty Problems          Dermatology Problems    Contact dermatitis     Past Medical History:  Josse Owens  has a past medical history of Acute lower UTI (urinary tract infection) (10/04/2023), Acute rhinosinusitis (10/04/2023), Allergy, unspecified, initial encounter (11/03/2013), Cough, unspecified (11/03/2013), Dehydration (11/03/2013), Dermatitis, unspecified (11/03/2013), Edema, unspecified (11/03/2013), Encounter for general adult medical examination without abnormal findings (11/03/2013), Encounter for gynecological examination (general) (routine) without abnormal findings (11/03/2013), Hemangioma of skin and subcutaneous tissue (10/04/2022), Melanocytic nevi of trunk (10/04/2022), Noninfective gastroenteritis and colitis, unspecified (11/03/2013), Other fatigue (11/03/2013), Other seborrheic keratosis (10/04/2022), Other specified disorders of veins (11/03/2013), Personal history of other diseases of the nervous system and sense organs (10/29/2014), Personal history of peptic ulcer disease, Personal history of urinary (tract) infections (06/24/2015), Unspecified abdominal pain (11/03/2013), and Urinary symptom or sign (10/04/2023).    Past Surgical History:  Josse Owens  has a past surgical history that includes Other surgical history (11/13/2019); Tonsillectomy (10/29/2014); Colonoscopy (11/01/2017); Other surgical history  (11/03/2020); and Other surgical history (11/03/2020).    Family History:  Patient family history includes Arthritis in her mother; Heart disease in her father.    Social History:  Josse Owens  reports that she has never smoked. She has never used smokeless tobacco. She reports that she does not drink alcohol and does not use drugs.    Allergies:  Bee venom protein (honey bee), Egg, Lexapro [escitalopram oxalate], Penicillins, and Tetracyclines    Current Medications / CAM's:  Current Medications[1]     Objective   Well appearing patient in no apparent distress; mood and affect are within normal limits.      Assessment/Plan   Skin Exam  1. NEVUS  Generalized  Uniform pigmented macule(s)/papule(s) with reassuring findings on dermoscopy  -Discussed nature of condition  -Reassurance, benign-appearing features on examination today  -Recommend continued observation  2. LENTIGO  Generalized  Tan macules  -Benign appearing on exam  -Reassurance, recommend observation  3. BRANHAM ANGIOMA  Generalized  Cherry red papules  -Discussed nature of condition  -Reassurance, recommend continued observation  4. SEBORRHEIC KERATOSIS  Generalized  Stuck on, waxy macule(s)/papule(s)/plaque(s) with comedo-like openings and milia like cysts  -Discussed nature of condition  -Reassurance, recommend continued observation  5. SCREENING EXAM FOR SKIN CANCER  Generalized  Follow Up In Dermatology  6. ONYCHOMYCOSIS  Left 5th Toenail  Thickened, discolored nail plate(s) with subungual debris   -Discussed nature of the condition  -This is a chronic issue that is often difficult to treat and will often recur once treated  -Reviewed treatment options  Related Medications  ciclopirox (Penlac) 8 % solution  Apply topically once daily at bedtime.  7. RHYTIDES  Head - Anterior (Face)  Dynamic and/or static rhytids, lentigines, changes associated with chronic sun exposure  Related Medications  tretinoin (Retin-A) 0.05 % cream  apply a thin layer to  affected area at bedtime as tolerated  8. ROSACEA  Head - Anterior (Face)  Erythema and telangiectasias  -Discussed nature of this chronic condition  -Recommend gentle skin care habits including gentle cleansers, non-comedogenic physical/mineral based sunscreen daily. Avoid exfoliation, wind and extreme temperatures when possible.  Related Medications  metroNIDAZOLE (Metrocream) 0.75 % cream  Apply 1 Application topically once daily.  9. ACTINIC KERATOSIS (2)  Left Forearm - Anterior, Left Lower Leg - Anterior  Erythematous scaly macules  -Discussed nature of diagnosis and treatment options.   -Patient wishes to proceed with Cryotherapy today  -Possible side effects of liquid nitrogen treatment reviewed including formation of blisters, crusting, tenderness, scar, and discoloration which may be permanent.  -Patient advised to return the office for re-evaluation if the treated lesion(s) do not resolve within 4-6 weeks. Patient verbalizes understanding.  Destr of lesion - Left Forearm - Anterior, Left Lower Leg - Anterior  Complexity: simple    Destruction method: cryotherapy    Informed consent: discussed and consent obtained    Lesion destroyed using liquid nitrogen: Yes    Region frozen until ice ball extended beyond lesion: Yes    Cryotherapy cycles:  1  Outcome: patient tolerated procedure well with no complications    Post-procedure details: wound care instructions given              [1]   Current Outpatient Medications:     amLODIPine (Norvasc) 2.5 mg tablet, Take 1 tablet (2.5 mg) by mouth once daily., Disp: 90 tablet, Rfl: 3    ciclopirox (Penlac) 8 % solution, Apply topically once daily at bedtime., Disp: 6.6 mL, Rfl: 11    escitalopram (Lexapro) 5 mg tablet, Take 1 tablet (5 mg) by mouth once daily., Disp: 90 tablet, Rfl: 3    metroNIDAZOLE (Metrocream) 0.75 % cream, Apply 1 Application topically once daily., Disp: 45 g, Rfl: 0    omeprazole (PriLOSEC) 40 mg DR capsule, Take 1 capsule (40 mg) by mouth once  daily., Disp: 90 capsule, Rfl: 3    predniSONE (Deltasone) 20 mg tablet, Take 2 tabs daily x 5d then 1 tab daily x 3d then 1/2 tab daily x 3d (Patient not taking: Reported on 10/9/2024), Disp: 15 tablet, Rfl: 0    simvastatin (Zocor) 20 mg tablet, Take 1 tablet (20 mg) by mouth once daily in the evening., Disp: 90 tablet, Rfl: 3    tretinoin (Retin-A) 0.05 % cream, apply a thin layer to affected area at bedtime as tolerated, Disp: 20 g, Rfl: 0    triamcinolone (Kenalog) 0.1 % cream, Apply topically 2 times a day. Apply to affected area 1-2 times daily as needed. Avoid face and groin., Disp: 30 g, Rfl: 2

## 2025-06-10 ENCOUNTER — APPOINTMENT (OUTPATIENT)
Dept: DERMATOLOGY | Facility: CLINIC | Age: 75
End: 2025-06-10
Payer: MEDICARE

## 2025-06-10 DIAGNOSIS — L30.8 PSORIASIFORM DERMATITIS: Primary | ICD-10-CM

## 2025-06-10 PROCEDURE — 1159F MED LIST DOCD IN RCRD: CPT | Performed by: NURSE PRACTITIONER

## 2025-06-10 PROCEDURE — 1036F TOBACCO NON-USER: CPT | Performed by: NURSE PRACTITIONER

## 2025-06-10 PROCEDURE — 99213 OFFICE O/P EST LOW 20 MIN: CPT | Performed by: NURSE PRACTITIONER

## 2025-06-10 RX ORDER — CLOBETASOL PROPIONATE 0.5 MG/ML
SOLUTION TOPICAL 2 TIMES DAILY
Qty: 50 ML | Refills: 3 | Status: SHIPPED | OUTPATIENT
Start: 2025-06-10 | End: 2025-06-24

## 2025-06-10 NOTE — Clinical Note
Two 1-2 cm patches of well demarcated erythema with micaceous scaling that is occasionally itching. New diagnosis.  No previous history and no additional lesions.

## 2025-06-10 NOTE — PROGRESS NOTES
Subjective     Josse Owens is a 74 y.o. female who presents for the following: Rash.   Established patient in for scaly patches on scalp     Review of Systems:  No other skin or systemic complaints other than what is documented elsewhere in the note.    The following portions of the chart were reviewed this encounter and updated as appropriate:       Skin Cancer History  Biopsy Log Book  No skin cancers from Specimen Tracking.    Additional History      Specialty Problems          Dermatology Problems    Contact dermatitis     Past Medical History:  Josse Owens  has a past medical history of Acute lower UTI (urinary tract infection) (10/04/2023), Acute rhinosinusitis (10/04/2023), Allergy, unspecified, initial encounter (11/03/2013), Cough, unspecified (11/03/2013), Dehydration (11/03/2013), Dermatitis, unspecified (11/03/2013), Edema, unspecified (11/03/2013), Encounter for general adult medical examination without abnormal findings (11/03/2013), Encounter for gynecological examination (general) (routine) without abnormal findings (11/03/2013), Hemangioma of skin and subcutaneous tissue (10/04/2022), Melanocytic nevi of trunk (10/04/2022), Noninfective gastroenteritis and colitis, unspecified (11/03/2013), Other fatigue (11/03/2013), Other seborrheic keratosis (10/04/2022), Other specified disorders of veins (11/03/2013), Personal history of other diseases of the nervous system and sense organs (10/29/2014), Personal history of peptic ulcer disease, Personal history of urinary (tract) infections (06/24/2015), Unspecified abdominal pain (11/03/2013), and Urinary symptom or sign (10/04/2023).    Past Surgical History:  Josse Owens  has a past surgical history that includes Other surgical history (11/13/2019); Tonsillectomy (10/29/2014); Colonoscopy (11/01/2017); Other surgical history (11/03/2020); and Other surgical history (11/03/2020).    Family History:  Patient family history includes  Arthritis in her mother; Heart disease in her father.    Social History:  Josse Owens  reports that she has never smoked. She has never used smokeless tobacco. She reports that she does not drink alcohol and does not use drugs.    Allergies:  Bee venom protein (honey bee), Egg, Lexapro [escitalopram oxalate], Penicillins, and Tetracyclines    Current Medications / CAM's:  Current Medications[1]     Objective   Well appearing patient in no apparent distress; mood and affect are within normal limits.      Assessment/Plan   Skin Exam  1. PSORIASIFORM DERMATITIS  Mid Frontal Scalp  Two 1-2 cm patches of well demarcated erythema with micaceous scaling that is occasionally itching. New diagnosis.  No previous history and no additional lesions.   -No significant history of psoriasis and no active lesions.     clobetasol (Temovate) 0.05 % external solution - Mid Frontal Scalp  Apply topically 2 times a day for 14 days.            [1]   Current Outpatient Medications:     amLODIPine (Norvasc) 2.5 mg tablet, Take 1 tablet (2.5 mg) by mouth once daily., Disp: 90 tablet, Rfl: 3    ciclopirox (Penlac) 8 % solution, Apply topically once daily at bedtime., Disp: 6.6 mL, Rfl: 11    clobetasol (Temovate) 0.05 % external solution, Apply topically 2 times a day for 14 days., Disp: 50 mL, Rfl: 3    escitalopram (Lexapro) 5 mg tablet, Take 1 tablet (5 mg) by mouth once daily., Disp: 90 tablet, Rfl: 3    metroNIDAZOLE (Metrocream) 0.75 % cream, Apply 1 Application topically once daily., Disp: 45 g, Rfl: 0    omeprazole (PriLOSEC) 40 mg DR capsule, Take 1 capsule (40 mg) by mouth once daily., Disp: 90 capsule, Rfl: 3    predniSONE (Deltasone) 20 mg tablet, Take 2 tabs daily x 5d then 1 tab daily x 3d then 1/2 tab daily x 3d (Patient not taking: Reported on 10/9/2024), Disp: 15 tablet, Rfl: 0    simvastatin (Zocor) 20 mg tablet, Take 1 tablet (20 mg) by mouth once daily in the evening., Disp: 90 tablet, Rfl: 3    tretinoin (Retin-A)  0.05 % cream, apply a thin layer to affected area at bedtime as tolerated, Disp: 20 g, Rfl: 0    triamcinolone (Kenalog) 0.1 % cream, Apply topically 2 times a day. Apply to affected area 1-2 times daily as needed. Avoid face and groin., Disp: 30 g, Rfl: 2

## 2025-07-16 LAB
NON-UH HIE AMYLASE: 74 UNIT/L (ref 30–118)
NON-UH HIE IGA: 164 MG/DL (ref 33–540)
NON-UH HIE LIPASE: 53 UNIT/L (ref 12–53)

## 2025-07-17 LAB — NON-UH HIE TISSUE TRANSGLUTAMINASE AB,IGA: <1.02 (ref 0–4.99)

## 2025-07-25 DIAGNOSIS — L98.8 RHYTIDES: ICD-10-CM

## 2025-07-28 RX ORDER — TRETINOIN 0.5 MG/G
CREAM TOPICAL
Qty: 20 G | Refills: 0 | Status: SHIPPED | OUTPATIENT
Start: 2025-07-28

## 2026-04-30 ENCOUNTER — APPOINTMENT (OUTPATIENT)
Dept: DERMATOLOGY | Facility: CLINIC | Age: 76
End: 2026-04-30
Payer: MEDICARE